# Patient Record
Sex: FEMALE
[De-identification: names, ages, dates, MRNs, and addresses within clinical notes are randomized per-mention and may not be internally consistent; named-entity substitution may affect disease eponyms.]

---

## 2020-09-03 ENCOUNTER — TRANSCRIPTION ENCOUNTER (OUTPATIENT)
Age: 65
End: 2020-09-03

## 2021-06-01 ENCOUNTER — APPOINTMENT (OUTPATIENT)
Dept: FAMILY MEDICINE | Facility: CLINIC | Age: 66
End: 2021-06-01
Payer: MEDICARE

## 2021-06-01 VITALS
HEART RATE: 62 BPM | SYSTOLIC BLOOD PRESSURE: 120 MMHG | DIASTOLIC BLOOD PRESSURE: 70 MMHG | OXYGEN SATURATION: 99 % | WEIGHT: 118 LBS | TEMPERATURE: 96.4 F | BODY MASS INDEX: 21.71 KG/M2 | HEIGHT: 62 IN

## 2021-06-01 DIAGNOSIS — S00.96XA INSECT BITE (NONVENOMOUS) OF UNSPECIFIED PART OF HEAD, INITIAL ENCOUNTER: ICD-10-CM
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
## 2021-06-01 DIAGNOSIS — R21 RASH AND OTHER NONSPECIFIC SKIN ERUPTION: ICD-10-CM

## 2021-06-01 DIAGNOSIS — M25.50 PAIN IN UNSPECIFIED JOINT: ICD-10-CM

## 2021-06-01 PROBLEM — Z00.00 ENCOUNTER FOR PREVENTIVE HEALTH EXAMINATION: Status: ACTIVE | Noted: 2021-06-01

## 2021-06-01 PROCEDURE — 99213 OFFICE O/P EST LOW 20 MIN: CPT

## 2021-06-01 RX ORDER — DOXYCYCLINE HYCLATE 100 MG/1
100 CAPSULE ORAL
Qty: 28 | Refills: 0 | Status: ACTIVE | COMMUNITY
Start: 2021-06-01 | End: 1900-01-01

## 2021-06-01 NOTE — HISTORY OF PRESENT ILLNESS
[FreeTextEntry8] : Pt was bitten by tick approx one week ago . She removed it within a few hours , the area still appears red ,wishes to take Doxycycline for her arthralgias that have been progressively increasing since the tick bite occurred\par Patient was seen in 2020 for a Bulls eye rash secondary to a tick bite . She is requesting same course of treatment due to her symptoms\par She denies a bulls eye rash today

## 2021-06-01 NOTE — REVIEW OF SYSTEMS
[Joint Pain] : joint pain [Joint Stiffness] : joint stiffness [Joint Swelling] : joint swelling [Muscle Pain] : muscle pain [Negative] : Heme/Lymph

## 2021-06-01 NOTE — PHYSICAL EXAM
[No Acute Distress] : no acute distress [Well Nourished] : well nourished [Well Developed] : well developed [Well-Appearing] : well-appearing [Normal Sclera/Conjunctiva] : normal sclera/conjunctiva [PERRL] : pupils equal round and reactive to light [EOMI] : extraocular movements intact [Normal Outer Ear/Nose] : the outer ears and nose were normal in appearance [Normal Oropharynx] : the oropharynx was normal [No JVD] : no jugular venous distention [No Lymphadenopathy] : no lymphadenopathy [Supple] : supple [Thyroid Normal, No Nodules] : the thyroid was normal and there were no nodules present [No Respiratory Distress] : no respiratory distress  [No Accessory Muscle Use] : no accessory muscle use [Clear to Auscultation] : lungs were clear to auscultation bilaterally [Normal Rate] : normal rate  [Regular Rhythm] : with a regular rhythm [Normal S1, S2] : normal S1 and S2 [No Murmur] : no murmur heard [No Carotid Bruits] : no carotid bruits [No Abdominal Bruit] : a ~M bruit was not heard ~T in the abdomen [No Varicosities] : no varicosities [Pedal Pulses Present] : the pedal pulses are present [No Edema] : there was no peripheral edema [No Palpable Aorta] : no palpable aorta [No Extremity Clubbing/Cyanosis] : no extremity clubbing/cyanosis [Soft] : abdomen soft [Non Tender] : non-tender [Non-distended] : non-distended [No Masses] : no abdominal mass palpated [No HSM] : no HSM [Normal Bowel Sounds] : normal bowel sounds [Normal Posterior Cervical Nodes] : no posterior cervical lymphadenopathy [Normal Anterior Cervical Nodes] : no anterior cervical lymphadenopathy [No CVA Tenderness] : no CVA  tenderness [No Spinal Tenderness] : no spinal tenderness [No Joint Swelling] : no joint swelling [Grossly Normal Strength/Tone] : grossly normal strength/tone [No Rash] : no rash [Coordination Grossly Intact] : coordination grossly intact [No Focal Deficits] : no focal deficits [Normal Gait] : normal gait [Deep Tendon Reflexes (DTR)] : deep tendon reflexes were 2+ and symmetric [Normal Affect] : the affect was normal [Normal Insight/Judgement] : insight and judgment were intact [de-identified] : Tick bite left lower leg , no sign of infection

## 2021-06-01 NOTE — PLAN
[FreeTextEntry1] :  \par \par I have agreed to tx patient with 14 day course of Doxycyline at her urging .\par Patient is traveling and wishes to have adequate supply of her medication \par She will return for lyme testing

## 2021-06-02 DIAGNOSIS — E78.5 HYPERLIPIDEMIA, UNSPECIFIED: ICD-10-CM

## 2021-06-02 DIAGNOSIS — Z80.41 FAMILY HISTORY OF MALIGNANT NEOPLASM OF OVARY: ICD-10-CM

## 2021-06-02 DIAGNOSIS — Z63.5 DISRUPTION OF FAMILY BY SEPARATION AND DIVORCE: ICD-10-CM

## 2021-06-02 DIAGNOSIS — Z83.3 FAMILY HISTORY OF DIABETES MELLITUS: ICD-10-CM

## 2021-06-02 DIAGNOSIS — R48.0 DYSLEXIA AND ALEXIA: ICD-10-CM

## 2021-06-02 DIAGNOSIS — Z87.891 PERSONAL HISTORY OF NICOTINE DEPENDENCE: ICD-10-CM

## 2021-06-02 DIAGNOSIS — R73.01 IMPAIRED FASTING GLUCOSE: ICD-10-CM

## 2021-06-02 DIAGNOSIS — Z86.19 PERSONAL HISTORY OF OTHER INFECTIOUS AND PARASITIC DISEASES: ICD-10-CM

## 2021-06-02 DIAGNOSIS — Z82.49 FAMILY HISTORY OF ISCHEMIC HEART DISEASE AND OTHER DISEASES OF THE CIRCULATORY SYSTEM: ICD-10-CM

## 2021-06-02 DIAGNOSIS — Z78.9 OTHER SPECIFIED HEALTH STATUS: ICD-10-CM

## 2021-06-02 RX ORDER — FLAXSEED OIL 1000 MG
10 CAPSULE ORAL
Refills: 0 | Status: ACTIVE | COMMUNITY
Start: 2021-06-02

## 2021-06-02 RX ORDER — TURMERIC 95 %
POWDER (GRAM) MISCELLANEOUS
Refills: 0 | Status: ACTIVE | COMMUNITY
Start: 2021-06-02

## 2021-06-02 RX ORDER — TRIFOLIUM PRATENSE, CAPSICUM ANNUUM, PHYTOLACCA DECANDRA, ALUMINUM METALLICUM, ANTIMONIUM CRUDUM, ARGENTUM METALLICUM, ARSENICUM ALBUM, AURUM METALLICUM, BARYTA CARBONICA, BERYLLIUM METALLICUM, BISMUTHUM METALLICUM, BORON, BROMIUM, CADMIUM METALLICUM, CERIUM METALLICUM, CESIUM CHLORIDE, CHROMIUM, COBALTUM METALLICUM, CUPRUM METALLICUM, DYSPROSIUM METALLICUM, ERBIUM METALLICUM, EUROPIUM OXYDATUM, FERRUM METALLICUM, GADOLINIUM METALLICUM, GERMANIUM SESQUIOXIDE, HOLMIUM METALLICUM, INDIUM METALLICUM, 3; 3; 4; 12; 12; 12; 12; 12; 12; 12; 12; 12; 12; 12; 12; 12; 12; 12; 12; 12; 12; 12; 12; 12; 12; 12; 12; 12; 12; 12; 12; 12; 12; 12; 12; 12; 12; 12; 12; 12; 12; 12; 12; 12; 12; 12; 12; 12; 12; 12; 12; 30; 30; 30; 30; 30; 30; 14; 30; 30; 30; 30; 30; 30; 30; 30; 30; 30; 30; 30; 30; 30; 30; 30; 30; 30; 30; 30; 30; 30; 30 [HP_X]/ML; [HP_X]/ML; [HP_X]/ML; [HP_X]/ML; [HP_X]/ML; [HP_X]/ML; [HP_X]/ML; [HP_X]/ML; [HP_X]/ML; [HP_X]/ML; [HP_X]/ML; [HP_X]/ML; [HP_X]/ML; [HP_X]/ML; [HP_X]/ML; [HP_X]/ML; [HP_X]/ML; [HP_X]/ML; [HP_X]/ML; [HP_X]/ML; [HP_X]/ML; [HP_X]/ML; [HP_X]/ML; [HP_X]/ML; [HP_X]/ML; [HP_X]/ML; [HP_X]/ML; [HP_X]/ML; [HP_X]/ML; [HP_X]/ML; [HP_X]/ML; [HP_X]/ML; [HP_X]/ML; [HP_X]/ML; [HP_X]/ML; [HP_X]/ML; [HP_X]/ML; [HP_X]/ML; [HP_X]/ML; [HP_X]/ML; [HP_X]/ML; [HP_X]/ML; [HP_X]/ML; [HP_X]/ML; [HP_X]/ML; [HP_X]/ML; [HP_X]/ML; [HP_X]/ML; [HP_X]/ML; [HP_X]/ML; [HP_X]/ML; [HP_X]/ML; [HP_X]/ML; [HP_X]/ML; [HP_X]/ML; [HP_X]/ML; [HP_X]/ML; [HP_C]/ML; [HP_C]/ML; [HP_C]/ML; [HP_C]/ML; [HP_C]/ML; [HP_C]/ML; [HP_C]/ML; [HP_C]/ML; [HP_C]/ML; [HP_C]/ML; [HP_C]/ML; [HP_C]/ML; [HP_C]/ML; [HP_C]/ML; [HP_C]/ML; [HP_C]/ML; [HP_C]/ML; [HP_C]/ML; [HP_C]/ML; [HP_C]/ML; [HP_C]/ML; [HP_C]/ML; [HP_C]/ML; [HP_C]/ML
LIQUID ORAL
Refills: 0 | Status: ACTIVE | COMMUNITY
Start: 2021-06-02

## 2021-06-02 RX ORDER — YOHIMBE BARK 500 MG
500 CAPSULE ORAL
Refills: 0 | Status: ACTIVE | COMMUNITY
Start: 2021-06-02

## 2021-06-02 SDOH — SOCIAL STABILITY - SOCIAL INSECURITY: DISRUPTION OF FAMILY BY SEPARATION AND DIVORCE: Z63.5
